# Patient Record
Sex: MALE | Race: WHITE
[De-identification: names, ages, dates, MRNs, and addresses within clinical notes are randomized per-mention and may not be internally consistent; named-entity substitution may affect disease eponyms.]

---

## 2017-07-05 LAB
ERYTHROCYTE [DISTWIDTH] IN BLOOD BY AUTOMATED COUNT: 12.4 % (ref 11.5–14.5)
HCT VFR BLD CALC: 44 % (ref 42–54)
HGB BLD-MCNC: 15.4 G/DL (ref 13.5–17.5)
MCH RBC QN AUTO: 34.4 PG (ref 26–34)
MCHC RBC AUTO-ENTMCNC: 35 G/DL (ref 31–37)
MCV RBC: 98.2 FL (ref 80–100)
PLATELET # BLD: 184 10X3/UL (ref 130–400)
PMV BLD AUTO: 9.8 FL (ref 7.4–10.4)
RBC # BLD AUTO: 4.48 10X6/UL (ref 4.2–6.1)
WBC # BLD AUTO: 7.2 10X3/UL (ref 4.8–10.8)

## 2017-07-06 ENCOUNTER — HOSPITAL ENCOUNTER (OUTPATIENT)
Dept: HOSPITAL 84 - D.OPS | Age: 77
Discharge: HOME | End: 2017-07-06
Attending: UROLOGY
Payer: MEDICARE

## 2017-07-06 VITALS — BODY MASS INDEX: 31.15 KG/M2 | BODY MASS INDEX: 31.15 KG/M2 | HEIGHT: 72 IN | WEIGHT: 230 LBS

## 2017-07-06 VITALS — SYSTOLIC BLOOD PRESSURE: 105 MMHG | DIASTOLIC BLOOD PRESSURE: 60 MMHG

## 2017-07-06 DIAGNOSIS — N13.8: ICD-10-CM

## 2017-07-06 DIAGNOSIS — Z01.812: ICD-10-CM

## 2017-07-06 DIAGNOSIS — N39.41: ICD-10-CM

## 2017-07-06 DIAGNOSIS — N40.1: Primary | ICD-10-CM

## 2017-07-06 DIAGNOSIS — N30.10: ICD-10-CM

## 2017-07-06 DIAGNOSIS — R35.0: ICD-10-CM

## 2017-07-06 LAB
ANION GAP SERPL CALC-SCNC: 9.3 MMOL/L (ref 8–16)
BUN SERPL-MCNC: 18 MG/DL (ref 7–18)
CALCIUM SERPL-MCNC: 8.7 MG/DL (ref 8.5–10.1)
CHLORIDE SERPL-SCNC: 103 MMOL/L (ref 98–107)
CO2 SERPL-SCNC: 28.7 MMOL/L (ref 21–32)
CREAT SERPL-MCNC: 1.4 MG/DL (ref 0.6–1.3)
GLUCOSE SERPL-MCNC: 118 MG/DL (ref 74–106)
OSMOLALITY SERPL CALC.SUM OF ELEC: 276 MOSM/KG (ref 275–300)
POTASSIUM SERPL-SCNC: 4 MMOL/L (ref 3.5–5.1)
SODIUM SERPL-SCNC: 137 MMOL/L (ref 136–145)

## 2017-07-06 NOTE — NUR
1400- PT WITH HOB ELEVATED. VSS. WIFE AT BEDSIDE.
1415- DR. VILLAREAL AT BEDSIDE, DISCUSSING PROCEDURAL FINDINGS. PT UP OOB
TO BR, VOIDED WITHOUT DIFFICULTY
1425- IV D/C'D, PT TOLERATED. CATHETER INTACT.
1445- DISCHARGE INSTRUCTIONS COMPLETED. PAPERWORK SIGNED.
1450- PT DISCHARGED VIA WHEELCHAIR WITH WIFE.

## 2018-10-10 ENCOUNTER — HOSPITAL ENCOUNTER (OUTPATIENT)
Dept: HOSPITAL 84 - D.CATH | Age: 78
Discharge: HOME | End: 2018-10-10
Attending: INTERNAL MEDICINE
Payer: MEDICARE

## 2018-10-10 VITALS — WEIGHT: 225.47 LBS | HEIGHT: 72 IN | BODY MASS INDEX: 30.54 KG/M2 | BODY MASS INDEX: 30.54 KG/M2

## 2018-10-10 VITALS — DIASTOLIC BLOOD PRESSURE: 64 MMHG | SYSTOLIC BLOOD PRESSURE: 163 MMHG

## 2018-10-10 DIAGNOSIS — R07.89: Primary | ICD-10-CM

## 2018-10-10 DIAGNOSIS — Z01.812: ICD-10-CM

## 2018-10-10 LAB
ANION GAP SERPL CALC-SCNC: 13.7 MMOL/L (ref 8–16)
BASOPHILS NFR BLD AUTO: 0.4 % (ref 0–2)
BUN SERPL-MCNC: 18 MG/DL (ref 7–18)
CALCIUM SERPL-MCNC: 9.7 MG/DL (ref 8.5–10.1)
CHLORIDE SERPL-SCNC: 102 MMOL/L (ref 98–107)
CO2 SERPL-SCNC: 26.9 MMOL/L (ref 21–32)
CREAT SERPL-MCNC: 1.1 MG/DL (ref 0.6–1.3)
EOSINOPHIL NFR BLD: 13.4 % (ref 0–7)
ERYTHROCYTE [DISTWIDTH] IN BLOOD BY AUTOMATED COUNT: 12.3 % (ref 11.5–14.5)
GLUCOSE SERPL-MCNC: 102 MG/DL (ref 74–106)
HCT VFR BLD CALC: 45.8 % (ref 42–54)
HGB BLD-MCNC: 16 G/DL (ref 13.5–17.5)
IMM GRANULOCYTES NFR BLD: 0.1 % (ref 0–5)
LYMPHOCYTES NFR BLD AUTO: 20.8 % (ref 15–50)
MCH RBC QN AUTO: 34.3 PG (ref 26–34)
MCHC RBC AUTO-ENTMCNC: 34.9 G/DL (ref 31–37)
MCV RBC: 98.1 FL (ref 80–100)
MONOCYTES NFR BLD: 6.7 % (ref 2–11)
NEUTROPHILS NFR BLD AUTO: 58.6 % (ref 40–80)
OSMOLALITY SERPL CALC.SUM OF ELEC: 277 MOSM/KG (ref 275–300)
PLATELET # BLD: 245 10X3/UL (ref 130–400)
PMV BLD AUTO: 9.7 FL (ref 7.4–10.4)
POTASSIUM SERPL-SCNC: 4.6 MMOL/L (ref 3.5–5.1)
RBC # BLD AUTO: 4.67 10X6/UL (ref 4.2–6.1)
SODIUM SERPL-SCNC: 138 MMOL/L (ref 136–145)
WBC # BLD AUTO: 9.3 10X3/UL (ref 4.8–10.8)

## 2018-10-15 ENCOUNTER — HOSPITAL ENCOUNTER (OUTPATIENT)
Dept: HOSPITAL 84 - D.CT | Age: 78
Discharge: HOME | End: 2018-10-15
Attending: FAMILY MEDICINE
Payer: MEDICARE

## 2018-10-15 VITALS — BODY MASS INDEX: 30.6 KG/M2

## 2018-10-15 DIAGNOSIS — R10.11: Primary | ICD-10-CM

## 2018-11-08 ENCOUNTER — HOSPITAL ENCOUNTER (OUTPATIENT)
Dept: HOSPITAL 84 - D.CT | Age: 78
Discharge: HOME | End: 2018-11-08
Attending: FAMILY MEDICINE
Payer: MEDICARE

## 2018-11-08 VITALS — BODY MASS INDEX: 30.6 KG/M2

## 2018-11-08 DIAGNOSIS — R91.8: Primary | ICD-10-CM

## 2018-12-22 ENCOUNTER — HOSPITAL ENCOUNTER (EMERGENCY)
Dept: HOSPITAL 84 - D.ER | Age: 78
Discharge: HOME | End: 2018-12-22
Payer: MEDICARE

## 2018-12-22 VITALS
BODY MASS INDEX: 31.22 KG/M2 | WEIGHT: 230.48 LBS | HEIGHT: 72 IN | BODY MASS INDEX: 31.22 KG/M2 | HEIGHT: 72 IN | WEIGHT: 230.48 LBS

## 2018-12-22 VITALS — SYSTOLIC BLOOD PRESSURE: 158 MMHG | DIASTOLIC BLOOD PRESSURE: 75 MMHG

## 2018-12-22 DIAGNOSIS — I10: ICD-10-CM

## 2018-12-22 DIAGNOSIS — K92.1: ICD-10-CM

## 2018-12-22 DIAGNOSIS — K57.32: Primary | ICD-10-CM

## 2018-12-22 LAB
ALBUMIN SERPL-MCNC: 3.9 G/DL (ref 3.4–5)
ALP SERPL-CCNC: 66 U/L (ref 46–116)
ALT SERPL-CCNC: 15 U/L (ref 10–68)
ANION GAP SERPL CALC-SCNC: 11.1 MMOL/L (ref 8–16)
APPEARANCE UR: CLEAR
APTT BLD: 26.3 SECONDS (ref 22.8–39.4)
BACTERIA #/AREA URNS HPF: (no result) /HPF
BASOPHILS NFR BLD AUTO: 0.5 % (ref 0–2)
BILIRUB SERPL-MCNC: 0.42 MG/DL (ref 0.2–1.3)
BILIRUB SERPL-MCNC: NEGATIVE MG/DL
BUN SERPL-MCNC: 18 MG/DL (ref 7–18)
CALCIUM SERPL-MCNC: 8.9 MG/DL (ref 8.5–10.1)
CHLORIDE SERPL-SCNC: 103 MMOL/L (ref 98–107)
CO2 SERPL-SCNC: 28.1 MMOL/L (ref 21–32)
COLOR UR: YELLOW
CREAT SERPL-MCNC: 1.3 MG/DL (ref 0.6–1.3)
EOSINOPHIL NFR BLD: 16.7 % (ref 0–7)
ERYTHROCYTE [DISTWIDTH] IN BLOOD BY AUTOMATED COUNT: 12.4 % (ref 11.5–14.5)
GLOBULIN SER-MCNC: 3.6 G/L
GLUCOSE SERPL-MCNC: 108 MG/DL (ref 74–106)
GLUCOSE SERPL-MCNC: NEGATIVE MG/DL
HCT VFR BLD CALC: 44.1 % (ref 42–54)
HGB BLD-MCNC: 15.1 G/DL (ref 13.5–17.5)
IMM GRANULOCYTES NFR BLD: 0.1 % (ref 0–5)
INR PPP: 0.96 (ref 0.85–1.17)
KETONES UR STRIP-MCNC: NEGATIVE MG/DL
LIPASE SERPL-CCNC: 134 U/L (ref 73–393)
LYMPHOCYTES NFR BLD AUTO: 16.4 % (ref 15–50)
MCH RBC QN AUTO: 33.3 PG (ref 26–34)
MCHC RBC AUTO-ENTMCNC: 34.2 G/DL (ref 31–37)
MCV RBC: 97.4 FL (ref 80–100)
MONOCYTES NFR BLD: 8.3 % (ref 2–11)
NEUTROPHILS NFR BLD AUTO: 58 % (ref 40–80)
NITRITE UR-MCNC: NEGATIVE MG/ML
OSMOLALITY SERPL CALC.SUM OF ELEC: 278 MOSM/KG (ref 275–300)
PH UR STRIP: 6 [PH] (ref 5–6)
PLATELET # BLD: 185 10X3/UL (ref 130–400)
PMV BLD AUTO: 9.8 FL (ref 7.4–10.4)
POTASSIUM SERPL-SCNC: 4.2 MMOL/L (ref 3.5–5.1)
PROT SERPL-MCNC: 7.5 G/DL (ref 6.4–8.2)
PROT UR-MCNC: NEGATIVE MG/DL
PROTHROMBIN TIME: 12.3 SECONDS (ref 11.6–15)
RBC # BLD AUTO: 4.53 10X6/UL (ref 4.2–6.1)
RBC #/AREA URNS HPF: (no result) /HPF (ref 0–5)
SODIUM SERPL-SCNC: 138 MMOL/L (ref 136–145)
SP GR UR STRIP: 1.01 (ref 1–1.02)
TROPONIN I SERPL-MCNC: < 0.017 NG/ML (ref 0–0.06)
UROBILINOGEN UR-MCNC: NORMAL MG/DL
WBC # BLD AUTO: 10.2 10X3/UL (ref 4.8–10.8)
WBC #/AREA URNS HPF: (no result) /HPF (ref 0–5)

## 2019-01-14 ENCOUNTER — HOSPITAL ENCOUNTER (OUTPATIENT)
Dept: HOSPITAL 84 - D.LABREF | Age: 79
Discharge: HOME | End: 2019-01-14
Attending: UROLOGY
Payer: MEDICARE

## 2019-01-14 VITALS — BODY MASS INDEX: 31.2 KG/M2

## 2019-01-14 DIAGNOSIS — R31.9: Primary | ICD-10-CM

## 2019-01-21 LAB
ANION GAP SERPL CALC-SCNC: 12 MMOL/L (ref 8–16)
BASOPHILS NFR BLD AUTO: 0.5 % (ref 0–2)
BUN SERPL-MCNC: 18 MG/DL (ref 7–18)
CALCIUM SERPL-MCNC: 8.8 MG/DL (ref 8.5–10.1)
CHLORIDE SERPL-SCNC: 103 MMOL/L (ref 98–107)
CO2 SERPL-SCNC: 29.5 MMOL/L (ref 21–32)
CREAT SERPL-MCNC: 1.2 MG/DL (ref 0.6–1.3)
EOSINOPHIL NFR BLD: 20.4 % (ref 0–7)
ERYTHROCYTE [DISTWIDTH] IN BLOOD BY AUTOMATED COUNT: 12.5 % (ref 11.5–14.5)
GLUCOSE SERPL-MCNC: 77 MG/DL (ref 74–106)
HCT VFR BLD CALC: 45.4 % (ref 42–54)
HGB BLD-MCNC: 15.6 G/DL (ref 13.5–17.5)
IMM GRANULOCYTES NFR BLD: 0.1 % (ref 0–5)
LYMPHOCYTES NFR BLD AUTO: 24.9 % (ref 15–50)
MCH RBC QN AUTO: 33.2 PG (ref 26–34)
MCHC RBC AUTO-ENTMCNC: 34.4 G/DL (ref 31–37)
MCV RBC: 96.6 FL (ref 80–100)
MONOCYTES NFR BLD: 6.8 % (ref 2–11)
NEUTROPHILS NFR BLD AUTO: 47.3 % (ref 40–80)
OSMOLALITY SERPL CALC.SUM OF ELEC: 279 MOSM/KG (ref 275–300)
PLATELET # BLD: 184 10X3/UL (ref 130–400)
PMV BLD AUTO: 9.8 FL (ref 7.4–10.4)
POTASSIUM SERPL-SCNC: 4.5 MMOL/L (ref 3.5–5.1)
RBC # BLD AUTO: 4.7 10X6/UL (ref 4.2–6.1)
SODIUM SERPL-SCNC: 140 MMOL/L (ref 136–145)
WBC # BLD AUTO: 8.8 10X3/UL (ref 4.8–10.8)

## 2019-01-22 ENCOUNTER — HOSPITAL ENCOUNTER (INPATIENT)
Dept: HOSPITAL 84 - D.MS | Age: 79
LOS: 5 days | Discharge: HOME | DRG: 330 | End: 2019-01-27
Attending: SURGERY | Admitting: SURGERY
Payer: MEDICARE

## 2019-01-22 VITALS — DIASTOLIC BLOOD PRESSURE: 67 MMHG | SYSTOLIC BLOOD PRESSURE: 163 MMHG

## 2019-01-22 VITALS
BODY MASS INDEX: 31.5 KG/M2 | BODY MASS INDEX: 31.5 KG/M2 | HEIGHT: 71 IN | WEIGHT: 225 LBS | BODY MASS INDEX: 31.5 KG/M2 | WEIGHT: 225 LBS | BODY MASS INDEX: 31.5 KG/M2 | HEIGHT: 71 IN

## 2019-01-22 VITALS — SYSTOLIC BLOOD PRESSURE: 165 MMHG | DIASTOLIC BLOOD PRESSURE: 75 MMHG

## 2019-01-22 VITALS — SYSTOLIC BLOOD PRESSURE: 162 MMHG | DIASTOLIC BLOOD PRESSURE: 75 MMHG

## 2019-01-22 VITALS — SYSTOLIC BLOOD PRESSURE: 171 MMHG | DIASTOLIC BLOOD PRESSURE: 78 MMHG

## 2019-01-22 DIAGNOSIS — I10: ICD-10-CM

## 2019-01-22 DIAGNOSIS — E78.00: ICD-10-CM

## 2019-01-22 DIAGNOSIS — K56.7: ICD-10-CM

## 2019-01-22 DIAGNOSIS — K57.32: Primary | ICD-10-CM

## 2019-01-22 PROCEDURE — 0DTN0ZZ RESECTION OF SIGMOID COLON, OPEN APPROACH: ICD-10-PCS | Performed by: SURGERY

## 2019-01-23 VITALS — SYSTOLIC BLOOD PRESSURE: 130 MMHG | DIASTOLIC BLOOD PRESSURE: 64 MMHG

## 2019-01-23 VITALS — DIASTOLIC BLOOD PRESSURE: 66 MMHG | SYSTOLIC BLOOD PRESSURE: 123 MMHG

## 2019-01-23 VITALS — DIASTOLIC BLOOD PRESSURE: 61 MMHG | SYSTOLIC BLOOD PRESSURE: 128 MMHG

## 2019-01-23 VITALS — DIASTOLIC BLOOD PRESSURE: 62 MMHG | SYSTOLIC BLOOD PRESSURE: 132 MMHG

## 2019-01-23 VITALS — SYSTOLIC BLOOD PRESSURE: 117 MMHG | DIASTOLIC BLOOD PRESSURE: 59 MMHG

## 2019-01-23 LAB
ANION GAP SERPL CALC-SCNC: 16.9 MMOL/L (ref 8–16)
BASOPHILS NFR BLD AUTO: 0 % (ref 0–2)
BUN SERPL-MCNC: 15 MG/DL (ref 7–18)
CALCIUM SERPL-MCNC: 8.1 MG/DL (ref 8.5–10.1)
CHLORIDE SERPL-SCNC: 102 MMOL/L (ref 98–107)
CO2 SERPL-SCNC: 22.5 MMOL/L (ref 21–32)
CREAT SERPL-MCNC: 1.2 MG/DL (ref 0.6–1.3)
EOSINOPHIL NFR BLD: 0 % (ref 0–7)
ERYTHROCYTE [DISTWIDTH] IN BLOOD BY AUTOMATED COUNT: 12.4 % (ref 11.5–14.5)
GLUCOSE SERPL-MCNC: 137 MG/DL (ref 74–106)
HCT VFR BLD CALC: 41.7 % (ref 42–54)
HGB BLD-MCNC: 14.5 G/DL (ref 13.5–17.5)
IMM GRANULOCYTES NFR BLD: 0.2 % (ref 0–5)
LYMPHOCYTES NFR BLD AUTO: 6.4 % (ref 15–50)
MCH RBC QN AUTO: 33.3 PG (ref 26–34)
MCHC RBC AUTO-ENTMCNC: 34.8 G/DL (ref 31–37)
MCV RBC: 95.9 FL (ref 80–100)
MONOCYTES NFR BLD: 5.5 % (ref 2–11)
NEUTROPHILS NFR BLD AUTO: 87.9 % (ref 40–80)
OSMOLALITY SERPL CALC.SUM OF ELEC: 276 MOSM/KG (ref 275–300)
PLATELET # BLD: 188 10X3/UL (ref 130–400)
PMV BLD AUTO: 9.8 FL (ref 7.4–10.4)
POTASSIUM SERPL-SCNC: 4.4 MMOL/L (ref 3.5–5.1)
RBC # BLD AUTO: 4.35 10X6/UL (ref 4.2–6.1)
SODIUM SERPL-SCNC: 137 MMOL/L (ref 136–145)
WBC # BLD AUTO: 13.6 10X3/UL (ref 4.8–10.8)

## 2019-01-23 NOTE — NUR
PT REPORTS FEELING OF NOT FULLY EMPTYING BLADDER, SUGGESTED BLADDER SCANNER,
PT REFUSED STATING HE WASN'T FULL YET. WILL CONTINUE TO MONITOR OUTPUT.

## 2019-01-24 VITALS — DIASTOLIC BLOOD PRESSURE: 50 MMHG | SYSTOLIC BLOOD PRESSURE: 129 MMHG

## 2019-01-24 VITALS — DIASTOLIC BLOOD PRESSURE: 60 MMHG | SYSTOLIC BLOOD PRESSURE: 119 MMHG

## 2019-01-24 VITALS — SYSTOLIC BLOOD PRESSURE: 125 MMHG | DIASTOLIC BLOOD PRESSURE: 58 MMHG

## 2019-01-24 LAB
ANION GAP SERPL CALC-SCNC: 13.4 MMOL/L (ref 8–16)
BASOPHILS NFR BLD AUTO: 0.2 % (ref 0–2)
BUN SERPL-MCNC: 24 MG/DL (ref 7–18)
CALCIUM SERPL-MCNC: 7.2 MG/DL (ref 8.5–10.1)
CHLORIDE SERPL-SCNC: 103 MMOL/L (ref 98–107)
CO2 SERPL-SCNC: 25.1 MMOL/L (ref 21–32)
CREAT SERPL-MCNC: 1.6 MG/DL (ref 0.6–1.3)
EOSINOPHIL NFR BLD: 2.8 % (ref 0–7)
ERYTHROCYTE [DISTWIDTH] IN BLOOD BY AUTOMATED COUNT: 13.3 % (ref 11.5–14.5)
GLUCOSE SERPL-MCNC: 93 MG/DL (ref 74–106)
HCT VFR BLD CALC: 37.4 % (ref 42–54)
HGB BLD-MCNC: 12.2 G/DL (ref 13.5–17.5)
IMM GRANULOCYTES NFR BLD: 0.2 % (ref 0–5)
LYMPHOCYTES NFR BLD AUTO: 17.4 % (ref 15–50)
MCH RBC QN AUTO: 33 PG (ref 26–34)
MCHC RBC AUTO-ENTMCNC: 32.6 G/DL (ref 31–37)
MCV RBC: 101.1 FL (ref 80–100)
MONOCYTES NFR BLD: 6 % (ref 2–11)
NEUTROPHILS NFR BLD AUTO: 73.4 % (ref 40–80)
OSMOLALITY SERPL CALC.SUM OF ELEC: 277 MOSM/KG (ref 275–300)
PLATELET # BLD: 152 10X3/UL (ref 130–400)
PMV BLD AUTO: 10 FL (ref 7.4–10.4)
POTASSIUM SERPL-SCNC: 4.5 MMOL/L (ref 3.5–5.1)
RBC # BLD AUTO: 3.7 10X6/UL (ref 4.2–6.1)
SODIUM SERPL-SCNC: 137 MMOL/L (ref 136–145)
WBC # BLD AUTO: 9 10X3/UL (ref 4.8–10.8)

## 2019-01-24 NOTE — NUR
PT LYING IN BED RESTING WITHOUT DISTRESS OR NEEDS. CONCUR W/ LPN ASSESSMENT.
CL IN REACH, WILL CONTINUE TO MONITOR

## 2019-01-24 NOTE — NUR
LPN NOTE-STATES PASSING GAS BUT BOWEL SOUNDS HYPOACTIVE. NO NAUSEA AT PRESENT.
STATES PAIN LEVEL5/10 BUT STATES PCA IS WORKING TO HELP. LAP SITES CLEAN AND
DRY WITH MIDLINE DRESSING CLEAN AND DRY. ABD DISTENDED. ENCOURAGED TO BE UP IN
CHAIR AND IN HALLWAY THIS SHIFT. WILL CONTINUE TO MONITOR

## 2019-01-24 NOTE — NUR
PT STATES HIS WIFE CAME BY AND BROUGHT, "SUPPLIES," FROM HOME. PT STATED HE
SELF CATHS AT HOME AND HE SELF CATHED HISELF AFTER SHE BROUGHT THEM.

## 2019-01-24 NOTE — OP
PATIENT NAME:  SAL DELGADO                            MEDICAL RECORD: [id number]
:[birth year]                                             LOCATION:D.MS [id number]
                                                         ADMISSION DATE:19
SURGEON:  JOSE ATKINSON MD          
 
 
DATE OF OPERATION:  2019
 
PREOPERATIVE DIAGNOSES:
1.  Recurrent diverticulitis.
2.  Hypertension.
3.  Hypercholesterolemia.
 
POSTOPERATIVE DIAGNOSES:
1.  Recurrent diverticulitis.
2.  Hypertension.
3.  Hypercholesterolemia.
 
PROCEDURE:  Hand-assisted laparoscopic sigmoid colectomy.
 
SURGEON:  Jose Atkinson MD
 
ASSISTANT:  FAUSTINA Mcgarry
 
REPORT OF OPERATION:  The patient's abdomen was prepped and draped in sterile
fashion.  A cutdown was made in the suprapubic region in the midline and
electrocautery was used to dissect through the subcutaneous tissues and fascia
into the abdominal cavity.  Once inside, a GelPort was inserted with a 5-mm
trocar within it.  The abdomen was insufflated and then a 5-mm trocar was placed
under direct visualization in the right lateral abdomen and a 12-mm trocar was
placed just anterior to the right anterior-superior iliac crest.  The patient
had some inflammatory adhesions of the sigmoid colon to the inferior aspect of
the abdominal cavity.  These were taken down using electrocautery.  Eventually,
we were able to free up this portion of the bowel and mobilize it medially.  We
took down the white line of Toldt and mobilized the splenic flexure using
electrocautery.  In order to facilitate this, I actually had to put another 5-mm
trocar in the left lateral abdomen.  Once the splenic flexure was mobilized,
then we were able to move the entire left colon medially.  A window was made in
the mesocolon at the rectosigmoid junction and the proximal rectum was
transected with a 45 blue load Endo-PETER stapler.  The mesentery was taken down
with two fires of 45 white load Endo-PETER stapler.  We then eviscerated this
portion of the sigmoid colon through the wound protector.  We performed clamp
and tie technique to take down some of the residual mesentery.  I then
eventually transected the distal descending colon using electrocautery.  This
portion of bowel was sent off for permanent specimen.  A 2-0 Prolene was used to
make a pursestring on the open bowel and a 29 EEA anvil was inserted.  After the
pursestring was tied down tightly, then the multiple anal dilators were placed
through the anus and rectum followed by the 29 EEA stapler.  An end-to-end
anastomosis was performed under direct visualization.  At the conclusion of
this, we had 2 complete rings of tissue in the stapler and we checked the
anastomosis under water by instilling air into the rectum and there was no sign
of leak.  The 3-0 silks were used in a Lemberted fashion to oversew the staple
line.  We then irrigated out the abdomen thoroughly with normal saline.  The
midline fascia was then closed with running #1 loop PDS's times 2.  The 12-mm
trocar site fascia was closed with a single interrupted #0 Vicryl.  The wounds
were irrigated out with normal saline and then closed with staples.
 
COMPLICATIONS:  None.
 
 
 
OPERATIVE REPORT                               [id number]    DANNYSAL          
 
 
 
CONDITION:  Stable.
 
ANESTHESIA:  General endotracheal.
 
BLOOD LOSS:  30 mL.
 
TRANSINT:[id number] Voice Confirmation ID: [id number] DOCUMENT ID: [id number]
                                           
                                           JOSE ATKINSON MD          
 
 
 
Electronically Signed by JOSE ATKINSON on 19 at 1044
 
 
 
 
 
 
 
 
 
 
 
 
 
 
 
 
 
 
 
 
 
 
 
 
 
 
 
 
 
 
 
 
 
CC: EMELY SCHULTZ MD and ELIZABETH COOPER MD                        [id number]
DICTATION DATE: 19 1533     :     19 1823      ADM IN  
                                                                              
[facility]                                          
[address] [place] [address]

## 2019-01-24 NOTE — MORECARE
CASE MANAGEMENT DISCHARGE SUMMARY
 
 
PATIENT: SAL DELGADO                      UNIT: [id number]
ACCOUNT#: [id number]                       ADM DATE: 19
AGE: 78     : 44  SEX: M            ROOM/BED: D.2205    
AUTHOR: FLO,DOC                             PHYSICIAN:                               
 
REFERRING PHYSICIAN: NEEMA ATKINSON MD          
DATE OF SERVICE: 19
Discharge Plan
 
 
Patient Name: SAL DELGADO
Facility: [facility]
Encounter #: [id number]
Medical Record #: [id number]
: 1940
Planned Disposition: Home
Anticipated Discharge Date: 
 
Discharge Date: 
Expected LOS: 
Initial Reviewer: [email or web address]
Initial Review Date: 2019
Generated: 19   1:57 pm 
Comments
 
DCP- Discharge Planning
 
Updated by [email or web address]: Tess Eaton on 19  11:54 am CT
Patient Name: SAL DELGADO                                     
Admission Status: Elective   
Accout number: [id number]                              
Admission Date: 2019   
: 1940                                                        
Admission Diagnosis:   
Attending: NEEMA ATKINSON                                                
Current LOS:  2   
  
Anticipated DC Date:    
Planned Disposition: Home   
Primary Insurance: MEDICARE A & B   
  
  
Discharge Planning Comments:   
CM met with patient to assess discharge planning needs. Patient lives 
independently at home where he plans to return at discharge. His wife Aruna 
will be his  home. He states his home is safe to return. He denies any 
HH or DME needs at this time. IMM served and explained. He anticipates to be 
 
discharged tomorrow. CM will continue to follow and assist with DC planning 
as needed  
  
  
  
  
  
: Tess Eaton
 DCPIA - Discharge Planning Initial Assessment
 
Updated by [email or web address]: Tess Eaton on 19  12:52 pm
*  Is the patient Alert and Oriented?
Yes
*  How many steps to enter\exit or inside your home?
 
*  PCP
[facility]
*  Pharmacy
[facility]
*  Preadmission Environment
Home with Family
*  ADLs
Independent
*  Equipment
None
*  List name and contact numbers for known caregivers / representatives who 
currently or will assist patient after discharge:
ARUNA (WIFE) [phone]
*  Verbal permission to speak to the caregivers and representatives has been 
obtained from the patient.
N/A
*  Community resources currently utilized
None
*  Additional services required to return to the preadmission environment?
No
*  Can the patient safely return to the preadmission environment?
Yes
*  Has this patient been hospitalized within the prior 30 days at any 
hospital?
No
 
 
 
 
 
Coverage Notice
 
Reviewer: [id number] Chelsey Eaton
 
Notice Issued Date-Time: 2019  12:00
Notice Type: IM Discharge Notice
 
 
Notice Delivered To: Patient
Relationship to Patient: 
Representative Name: 
 
Delivery Method: HAND - Hand Delivered
Brittny Days:
Prior Verbal Notification: 
 
Recipient Understood Notice: Yes
Recipient Signature: Yes
Med Rec Note Co-signed by Attending:
 
Coverage Notice Comment:  
Patient Name: SAL DELGADO
Encounter #: [id number]
Page [id number]
 
 
 
 
 
Electronically Signed by ARTHUR ROSSI on 19 at 1257
 
 
 
 
 
 
**All edits/amendments must be made on the electronic document**
 
DICTATION DATE: 19     : FADY  19 1256     
RPT#: [id number]                                DC DATE:        
                                               STATUS: ADM IN  
[facility]
[address] [place] [address]
***END OF REPORT***

## 2019-01-24 NOTE — NUR
SUPINE IN BED, DENIES NEEDS AT THIS TIME. STATES HIS PAIN IS WELL
CONTROLLED. REPORTS
HE HAS BEEN AND WILL STILL
CONTINUE TO SELF-CATH AS NEEDED. INFORMED PT OF IMPORTANCE OF ACCURATE OUTPUT
RECORDING. PT AGRRED TO EMPTY HIS VOIDS INTO URINAL OR HAT FOR MORE SPECIFIC
I&Os. SUGGESTED PT APPLY SCDs, PT REFUSED. WILL CONTINUE TO MONITOR.

## 2019-01-25 VITALS — DIASTOLIC BLOOD PRESSURE: 60 MMHG | SYSTOLIC BLOOD PRESSURE: 157 MMHG

## 2019-01-25 VITALS — DIASTOLIC BLOOD PRESSURE: 71 MMHG | SYSTOLIC BLOOD PRESSURE: 163 MMHG

## 2019-01-25 VITALS — SYSTOLIC BLOOD PRESSURE: 146 MMHG | DIASTOLIC BLOOD PRESSURE: 68 MMHG

## 2019-01-25 VITALS — SYSTOLIC BLOOD PRESSURE: 169 MMHG | DIASTOLIC BLOOD PRESSURE: 73 MMHG

## 2019-01-25 LAB
ANION GAP SERPL CALC-SCNC: 13 MMOL/L (ref 8–16)
BASOPHILS NFR BLD AUTO: 0.2 % (ref 0–2)
BUN SERPL-MCNC: 16 MG/DL (ref 7–18)
CALCIUM SERPL-MCNC: 7.3 MG/DL (ref 8.5–10.1)
CHLORIDE SERPL-SCNC: 104 MMOL/L (ref 98–107)
CO2 SERPL-SCNC: 23.9 MMOL/L (ref 21–32)
CREAT SERPL-MCNC: 1 MG/DL (ref 0.6–1.3)
EOSINOPHIL NFR BLD: 3.4 % (ref 0–7)
ERYTHROCYTE [DISTWIDTH] IN BLOOD BY AUTOMATED COUNT: 13.1 % (ref 11.5–14.5)
GLUCOSE SERPL-MCNC: 102 MG/DL (ref 74–106)
HCT VFR BLD CALC: 36.2 % (ref 42–54)
HGB BLD-MCNC: 11.7 G/DL (ref 13.5–17.5)
IMM GRANULOCYTES NFR BLD: 0.4 % (ref 0–5)
LYMPHOCYTES NFR BLD AUTO: 12 % (ref 15–50)
MCH RBC QN AUTO: 32.5 PG (ref 26–34)
MCHC RBC AUTO-ENTMCNC: 32.3 G/DL (ref 31–37)
MCV RBC: 100.6 FL (ref 80–100)
MONOCYTES NFR BLD: 6.8 % (ref 2–11)
NEUTROPHILS NFR BLD AUTO: 77.2 % (ref 40–80)
OSMOLALITY SERPL CALC.SUM OF ELEC: 274 MOSM/KG (ref 275–300)
PLATELET # BLD: 157 10X3/UL (ref 130–400)
PMV BLD AUTO: 10.1 FL (ref 7.4–10.4)
POTASSIUM SERPL-SCNC: 3.9 MMOL/L (ref 3.5–5.1)
RBC # BLD AUTO: 3.6 10X6/UL (ref 4.2–6.1)
SODIUM SERPL-SCNC: 137 MMOL/L (ref 136–145)
WBC # BLD AUTO: 8.4 10X3/UL (ref 4.8–10.8)

## 2019-01-25 NOTE — NUR
ADMINSTERED PT MEDICATION WITH NO ISSUES. PT TOLERATED WELL. PT APOLOGIZED FOR
"OUTBURST" STATES "I HOPE TO GET SOME REST."

## 2019-01-25 NOTE — NUR
WENT TO ENTER PT ROOM AND PT NOT IN ROOM. PT AT NURSES STATION UPSET. STATES
THAT HE IS "CHOKING ON PHLEGM" AND "I NEED MEDICINE NOW!!" CALMED PT DONE.
ASSESSED LUNGS. NO AUDIBLE CRACKLES OR WHEEZES NOTICED WHEN AUSCULTATING.
RETURNED PT BACK TO BED WITH NO ISSUES. CALL LIGHT IN REACH.

## 2019-01-25 NOTE — NUR
NUTRITION F/U
PT NOW ON CLEAR LIQUID DIET. WILL CONTINUE TO MONITOR DIET ADVANCEMENT, PO
INTAKE.
RD FOLLOWING

## 2019-01-25 NOTE — NUR
WHILE PASSING THE ROOM, I HEARD THE TOILET FLUSH. KNOCKED ON DOOR AND ENTERED
AS PT WAS LAYING BACK IN HIS BED. GLANCED IN BATHROOM TO SEE BOTH URINALS WERE
EMPTY AND URINE HAT THAT WAS PLACED UNDER TOILET SEAT WAS IN THE TRASH.
REINFORCED TO PT THAT WE NEED RECORD THE AMOUNT OF URINE IN MLS TO HELP ASSESS
HIS KIDNEY FUNCTION. PT SAID, "OH OKAY, OH YEAH," AND THEN BEGAN TO SPEAK
ABOUT HOW DISTENDED HE'S BEEN AND HOW HE HAS BEEN HOLDING FLUID, BUT HE IS
 GOING HOME TODAY. PT THE REQUESTED ALL LIGHTS BE OFF AND DOOR SHUT SO HE
COULD TRY TO GET SOME MORE REST.

## 2019-01-26 VITALS — SYSTOLIC BLOOD PRESSURE: 151 MMHG | DIASTOLIC BLOOD PRESSURE: 69 MMHG

## 2019-01-26 VITALS — DIASTOLIC BLOOD PRESSURE: 71 MMHG | SYSTOLIC BLOOD PRESSURE: 147 MMHG

## 2019-01-26 VITALS — SYSTOLIC BLOOD PRESSURE: 162 MMHG | DIASTOLIC BLOOD PRESSURE: 80 MMHG

## 2019-01-26 VITALS — DIASTOLIC BLOOD PRESSURE: 78 MMHG | SYSTOLIC BLOOD PRESSURE: 166 MMHG

## 2019-01-26 VITALS — DIASTOLIC BLOOD PRESSURE: 74 MMHG | SYSTOLIC BLOOD PRESSURE: 157 MMHG

## 2019-01-26 NOTE — NUR
PATIENT RESTING IN BED WITH CNA AT BEDSIDE AND REQUESTED SLEEP MED. PATIENT
DENIES OTHER NEEDS AT THIS TIME. BED IN LOWEST POSITION AND CL WITHIN REACH.
ENCOURAGED THE PATIENT TO CALL IF HE HAS NEEDS. WILL ADMINISTER MEDS AND
CONTINUE TO MONITOR.

## 2019-01-26 NOTE — NUR
MORNING ASSESSMENT COMPLETE. SEE ASSESSMENT FLWOSHEET FOR FURTHER DETAILS. PT
LYING IN BED AAO X4 TO PERSON, PLACE, TIME, AND SITUATION. WIFE AT BEDSIDE. 3
LAP SITES AND MIDLINE INCISION NOTED TO ABD. SLIGHT DISTENTION NOTED TO ABD;
PT CLAIMS IN HAS GONE DOWN A LOT. BOWEL SOUNDS ACTIVE X4. DENIES NEEDS AT THIS
TIME. CL INR EACH.

## 2019-01-27 VITALS — SYSTOLIC BLOOD PRESSURE: 91 MMHG | DIASTOLIC BLOOD PRESSURE: 46 MMHG

## 2019-01-27 VITALS — DIASTOLIC BLOOD PRESSURE: 72 MMHG | SYSTOLIC BLOOD PRESSURE: 154 MMHG

## 2019-01-27 VITALS — DIASTOLIC BLOOD PRESSURE: 72 MMHG | SYSTOLIC BLOOD PRESSURE: 147 MMHG

## 2019-01-29 NOTE — MORECARE
CASE MANAGEMENT DISCHARGE SUMMARY
 
 
PATIENT: SAL DELGADO                      UNIT: [id number]
ACCOUNT#: [id number]                       ADM DATE: 19
AGE: 78     : 44  SEX: M            ROOM/BED: D.2205    
AUTHOR: FLODOC                             PHYSICIAN:                               
 
REFERRING PHYSICIAN: NEEMA ATKINSON MD          
DATE OF SERVICE: 19
Discharge Plan
 
 
Patient Name: SAL DELGADO
Facility: [facility]
Encounter #: [id number]
Medical Record #: [id number]
: 1940
Planned Disposition: Home
Anticipated Discharge Date: 
 
Discharge Date: 2019
Expected LOS: 
Initial Reviewer: [id number]
Initial Review Date: 2019
Generated: 19   1:24 pm 
Comments
 
DCP- Discharge Planning
 
Updated by [email or web address]: Tess Eaton on 19  11:54 am CT
Patient Name: SAL DELGADO                                     
Admission Status: Elective   
Accout number: [id number]                              
Admission Date: 2019   
: 1940                                                        
Admission Diagnosis:   
Attending: NEEMA ATKINSON                                                
Current LOS:  2   
  
Anticipated DC Date:    
Planned Disposition: Home   
Primary Insurance: MEDICARE A & B   
  
  
Discharge Planning Comments:   
CM met with patient to assess discharge planning needs. Patient lives 
independently at home where he plans to return at discharge. His wife Aruna 
will be his  home. He states his home is safe to return. He denies any 
HH or DME needs at this time. IMM served and explained. He anticipates to be 
 
discharged tomorrow. CM will continue to follow and assist with DC planning 
as needed  
  
  
  
  
  
: Tess Eaton
 DCPIA - Discharge Planning Initial Assessment
 
Updated by [id number]: Tess Eaton on 19  12:52 pm
*  Is the patient Alert and Oriented?
Yes
*  How many steps to enter\exit or inside your home?
 
*  [facility]P
[facility]
*  Pharmacy
[facility]
*  Preadmission Environment
Home with Family
*  ADLs
Independent
*  Equipment
None
*  List name and contact numbers for known caregivers / representatives who 
currently or will assist patient after discharge:
ARUNA (WIFE) [phone]
*  Verbal permission to speak to the caregivers and representatives has been 
obtained from the patient.
N/A
*  Community resources currently utilized
None
*  Additional services required to return to the preadmission environment?
No
*  Can the patient safely return to the preadmission environment?
Yes
*  Has this patient been hospitalized within the prior 30 days at any 
hospital?
No
 
 
 
 
 
Coverage Notice
 
Reviewer: [id number] - Tess Eaton
 
Notice Issued Date-Time: 2019  12:00
Notice Type: IM Discharge Notice
 
 
Notice Delivered To: Patient
Relationship to Patient: 
Representative Name: 
 
Delivery Method: HAND - Hand Delivered
Brittny Days:
Prior Verbal Notification: 
 
Recipient Understood Notice: Yes
Recipient Signature: Yes
Med Rec Note Co-signed by Attending:
 
Coverage Notice Comment:  
 
Last DP export: 19  11:57 a
Patient Name: SAL DELGADO
Encounter #: [id number]
Page [id number]
 
 
 
 
 
Electronically Signed by ARTHUR ROSSI on 19 at 1224
 
 
 
 
 
 
**All edits/amendments must be made on the electronic document**
 
DICTATION DATE: 19     : FADY  19 1224     
RPT#: [id number]                                DC DATE:19
                                               STATUS: DIS IN  
[facility]
[address] [place] [address]
***END OF REPORT***

## 2019-02-26 LAB
ERYTHROCYTE [DISTWIDTH] IN BLOOD BY AUTOMATED COUNT: 12.7 % (ref 11.5–14.5)
HCT VFR BLD CALC: 45.4 % (ref 42–54)
HGB BLD-MCNC: 15.5 G/DL (ref 13.5–17.5)
MCH RBC QN AUTO: 33.2 PG (ref 26–34)
MCHC RBC AUTO-ENTMCNC: 34.1 G/DL (ref 31–37)
MCV RBC: 97.2 FL (ref 80–100)
PLATELET # BLD: 183 10X3/UL (ref 130–400)
PMV BLD AUTO: 9.7 FL (ref 7.4–10.4)
RBC # BLD AUTO: 4.67 10X6/UL (ref 4.2–6.1)
WBC # BLD AUTO: 9.1 10X3/UL (ref 4.8–10.8)

## 2019-02-28 ENCOUNTER — HOSPITAL ENCOUNTER (OUTPATIENT)
Dept: HOSPITAL 84 - D.OPS | Age: 79
Discharge: HOME | End: 2019-02-28
Attending: UROLOGY
Payer: MEDICARE

## 2019-02-28 VITALS
WEIGHT: 229 LBS | BODY MASS INDEX: 32.06 KG/M2 | HEIGHT: 71 IN | HEIGHT: 71 IN | WEIGHT: 229 LBS | BODY MASS INDEX: 32.06 KG/M2

## 2019-02-28 VITALS — SYSTOLIC BLOOD PRESSURE: 145 MMHG | DIASTOLIC BLOOD PRESSURE: 70 MMHG

## 2019-02-28 DIAGNOSIS — Z88.5: ICD-10-CM

## 2019-02-28 DIAGNOSIS — N13.8: ICD-10-CM

## 2019-02-28 DIAGNOSIS — R35.0: ICD-10-CM

## 2019-02-28 DIAGNOSIS — R39.15: ICD-10-CM

## 2019-02-28 DIAGNOSIS — N40.1: Primary | ICD-10-CM

## 2019-02-28 DIAGNOSIS — Z01.812: ICD-10-CM

## 2019-02-28 NOTE — OP
PATIENT NAME:  SAL DELGADO                            MEDICAL RECORD: [id number]
:44                                             LOCATION:D.OPS          
                                                         ADMISSION DATE:        
SURGEON:  GERARD VILLAREAL MD              
 
 
DATE OF OPERATION:  2019
 
SURGEON:  Gerard Villareal MD
 
ANESTHESIA:  TIVA by Gerald Goetz CRNA
 
DIAGNOSES:  Obstructive benign prostatic hyperplasia.  MARQUES 40 mL prostate.  IPSS
is 21.  Quality of life score is 5.
 
FINDINGS:  UroLift with 5 implants deployed, 3 remained in the patient, 2 are in
the left side and there is one on the right verumontanum level.
 
FINDINGS:  Bilateral lateral lobe hyperplasia, which is primarily obstructive
towards the apex.  Single ureteral orifices bilaterally with no bladder tumors.
 
BLOOD LOSS:  Minimal.
 
CLINICAL HISTORY:  This is a 78-year-old male with difficulty voiding.  He has
urinary urgency and frequency also.  He had cystoscopy, which showed obstructive
BPH.  He comes today to have the UroLift procedure done.  HE IS ALLERGIC TO
MORPHINE.  He was given Ancef on call to the OR.
 
DESCRIPTION OF PROCEDURE:  We gave the patient IV sedation.  He was then placed
in the dorsal lithotomy position and prepped and draped.  Cystoscopy showed an
obstructive bilateral lateral lobes primarily near the apex.  As we went towards
the bladder neck, the bladder neck was relatively open.  The prostatic urethra
was short in length.  The bladder was normal without any lesions.  Single
ureteral orifices are seen on each side.  I initially placed the 2 atypical
units at the level of the verumontanum.  These were placed at the anterior
lateral wall.  One unit was placed on each side.  I then placed a left bladder
neck level unit about 1.5 cm distal to the bladder neck.  This also held without
any difficulty.  I attempted twice to place a bladder neck unit on the right
side, but both times I hit bone or some other structure on the opposite side of
the prostate, which prevented the unit from firing properly.  As the urethra was
actually quite wide open, at this point I decided to abandon further attempts to
place the unit into the right bladder neck level.
 
TRANSINT:[id number] Voice Confirmation ID: [id number] DOCUMENT ID: [id number]
                                           
                                           GERARD VILLAREAL MD              
 
 
 
Electronically Signed by GERARD VILLAREAL on 19 at 1006
CC:                                                             [id number]
DICTATION DATE: 19     :     19      PRE [facility]                                          
 [facility]

## 2019-03-28 ENCOUNTER — HOSPITAL ENCOUNTER (OUTPATIENT)
Dept: HOSPITAL 84 - D.RT | Age: 79
Discharge: HOME | End: 2019-03-28
Attending: INTERNAL MEDICINE
Payer: MEDICARE

## 2019-03-28 VITALS — BODY MASS INDEX: 32 KG/M2

## 2019-03-28 DIAGNOSIS — R93.89: Primary | ICD-10-CM

## 2019-06-10 ENCOUNTER — HOSPITAL ENCOUNTER (OUTPATIENT)
Dept: HOSPITAL 84 - D.RAD | Age: 79
Discharge: HOME | End: 2019-06-10
Attending: INTERNAL MEDICINE
Payer: MEDICARE

## 2019-06-10 VITALS — BODY MASS INDEX: 32 KG/M2

## 2019-06-10 DIAGNOSIS — R13.10: Primary | ICD-10-CM

## 2019-12-20 ENCOUNTER — HOSPITAL ENCOUNTER (OUTPATIENT)
Dept: HOSPITAL 84 - D.RAD | Age: 79
Discharge: HOME | End: 2019-12-20
Attending: NURSE PRACTITIONER
Payer: MEDICARE

## 2019-12-20 VITALS — BODY MASS INDEX: 32 KG/M2

## 2019-12-20 DIAGNOSIS — M13.851: Primary | ICD-10-CM

## 2021-05-07 LAB
ANION GAP SERPL CALC-SCNC: 10.2 MMOL/L (ref 8–16)
APTT BLD: 27.3 SECONDS (ref 22.8–39.4)
BACTERIA #/AREA URNS HPF: (no result) HPF
BASOPHILS NFR BLD AUTO: 0.3 % (ref 0–2)
BILIRUB SERPL-MCNC: NEGATIVE MG/DL
BUN SERPL-MCNC: 14 MG/DL (ref 7–18)
CALCIUM SERPL-MCNC: 8.8 MG/DL (ref 8.5–10.1)
CHLORIDE SERPL-SCNC: 101 MMOL/L (ref 98–107)
CO2 SERPL-SCNC: 30.9 MMOL/L (ref 21–32)
CREAT SERPL-MCNC: 1.3 MG/DL (ref 0.6–1.3)
EOSINOPHIL NFR BLD: 8.4 % (ref 0–7)
ERYTHROCYTE [DISTWIDTH] IN BLOOD BY AUTOMATED COUNT: 12.4 % (ref 11.5–14.5)
GLUCOSE SERPL-MCNC: 111 MG/DL (ref 74–106)
HCT VFR BLD CALC: 46.9 % (ref 42–54)
HGB BLD-MCNC: 15.5 G/DL (ref 13.5–17.5)
IMM GRANULOCYTES NFR BLD: 0.2 % (ref 0–5)
INR PPP: 1.03 (ref 0.85–1.17)
KETONES UR STRIP-MCNC: NEGATIVE MG/DL
LYMPHOCYTES # BLD: 1.51 10X3/UL (ref 1.32–3.57)
LYMPHOCYTES NFR BLD AUTO: 24.3 % (ref 15–50)
MCH RBC QN AUTO: 33.2 PG (ref 26–34)
MCHC RBC AUTO-ENTMCNC: 33 G/DL (ref 31–37)
MCV RBC: 100.4 FL (ref 80–100)
MONOCYTES NFR BLD: 7.2 % (ref 2–11)
NEUTROPHILS # BLD: 3.7 10X3/UL (ref 1.78–5.38)
NEUTROPHILS NFR BLD AUTO: 59.6 % (ref 40–80)
NITRITE UR-MCNC: NEGATIVE MG/ML
OSMOLALITY SERPL CALC.SUM OF ELEC: 277 MOSM/KG (ref 275–300)
PH UR STRIP: 6 [PH] (ref 5–8)
PLATELET # BLD: 200 10X3/UL (ref 130–400)
PMV BLD AUTO: 9.7 FL (ref 7.4–10.4)
POTASSIUM SERPL-SCNC: 4.1 MMOL/L (ref 3.5–5.1)
PROTHROMBIN TIME: 12.5 SECONDS (ref 11.6–15)
RBC # BLD AUTO: 4.67 10X6/UL (ref 4.2–6.1)
SODIUM SERPL-SCNC: 138 MMOL/L (ref 136–145)
SQUAMOUS #/AREA URNS HPF: (no result) HPF (ref 0–4)
UROBILINOGEN UR-MCNC: NORMAL MG/DL (ref ?–2)
WBC # BLD AUTO: 6.2 10X3/UL (ref 4.8–10.8)
WBC #/AREA URNS HPF: (no result) HPF (ref 0–1)

## 2021-05-11 ENCOUNTER — HOSPITAL ENCOUNTER (OUTPATIENT)
Dept: HOSPITAL 84 - D.M3 | Age: 81
Setting detail: OBSERVATION
LOS: 1 days | Discharge: HOME | End: 2021-05-12
Attending: ORTHOPAEDIC SURGERY | Admitting: ORTHOPAEDIC SURGERY
Payer: MEDICARE

## 2021-05-11 VITALS — DIASTOLIC BLOOD PRESSURE: 98 MMHG | SYSTOLIC BLOOD PRESSURE: 117 MMHG

## 2021-05-11 VITALS — DIASTOLIC BLOOD PRESSURE: 114 MMHG | SYSTOLIC BLOOD PRESSURE: 139 MMHG

## 2021-05-11 VITALS
BODY MASS INDEX: 31.5 KG/M2 | BODY MASS INDEX: 31.5 KG/M2 | HEIGHT: 71 IN | BODY MASS INDEX: 31.5 KG/M2 | WEIGHT: 225 LBS | BODY MASS INDEX: 31.5 KG/M2 | HEIGHT: 71 IN | WEIGHT: 225 LBS

## 2021-05-11 VITALS — DIASTOLIC BLOOD PRESSURE: 58 MMHG | SYSTOLIC BLOOD PRESSURE: 116 MMHG

## 2021-05-11 VITALS — SYSTOLIC BLOOD PRESSURE: 115 MMHG | DIASTOLIC BLOOD PRESSURE: 65 MMHG

## 2021-05-11 VITALS — SYSTOLIC BLOOD PRESSURE: 134 MMHG | DIASTOLIC BLOOD PRESSURE: 54 MMHG

## 2021-05-11 VITALS — DIASTOLIC BLOOD PRESSURE: 46 MMHG | SYSTOLIC BLOOD PRESSURE: 117 MMHG

## 2021-05-11 VITALS — DIASTOLIC BLOOD PRESSURE: 75 MMHG | SYSTOLIC BLOOD PRESSURE: 143 MMHG

## 2021-05-11 VITALS — DIASTOLIC BLOOD PRESSURE: 65 MMHG | SYSTOLIC BLOOD PRESSURE: 130 MMHG

## 2021-05-11 DIAGNOSIS — F32.9: ICD-10-CM

## 2021-05-11 DIAGNOSIS — K57.92: ICD-10-CM

## 2021-05-11 DIAGNOSIS — Z96.651: ICD-10-CM

## 2021-05-11 DIAGNOSIS — E78.5: ICD-10-CM

## 2021-05-11 DIAGNOSIS — M23.51: Primary | ICD-10-CM

## 2021-05-11 DIAGNOSIS — N40.0: ICD-10-CM

## 2021-05-11 DIAGNOSIS — M16.11: ICD-10-CM

## 2021-05-11 DIAGNOSIS — D64.9: ICD-10-CM

## 2021-05-11 DIAGNOSIS — I10: ICD-10-CM

## 2021-05-11 NOTE — NUR
pt placed on cpm machine. ice bag to knee. call light in reach and fall alarm
on and active. has not voided yet but urinal in reach

## 2021-05-11 NOTE — NUR
ALERT RESTING IN BED, CPM IN USE DENIES PAIN OR NEEDS AT THIS TIME, SEE SHIFT
ASSESSMENT CALL LIGHT IN REACH

## 2021-05-11 NOTE — MORECARE
CASE MANAGEMENT DISCHARGE SUMMARY
 
 
PATIENT: SAL EDDY                      UNIT: [id number]
ACCOUNT#: [id number]                       ADM DATE: 21
AGE: 80     : 42  SEX: M            ROOM/BED: D.1209    
AUTHOR: ARTHUR ROSSI                             PHYSICIAN:                               
 
REFERRING PHYSICIAN: QUIRINO CABRERA DO         
DATE OF SERVICE: 21
Case Management Discharge Planning Summary
 
 
COMMENTS 
ENTERED DATE:  21  18:44 CT
COMMENT TYPE:  Discharge Planning
REVIEWER: Yony Suárez
CM met with patient to complete DC plan and to evaluate needs.  Patient 
lives independently with his wife, Aruna Eddy, [phone].  Patient 
stated that his home is safe and has electricity and running water.  Patient 
stated that he has no problems paying for medications and he fills his 
medications at [facility].  Patient stated that 
his primary care physician is Dr. Pardo.  At discharge, the patient plans 
to return home and feels this is a safe discharge.  CM discussed 
availability of home health, rehab services, and medical equipment.  Patient 
declined HHS, SNF, IPR, and DME.  Patient would like outpatient PT with 
[facility] next to [facility].  Patient stated that he his CPM and 
Walker were delivered before his surgery.  Patient stated that he has a 
Shower Bench.  VINOD signed and placed in chart.  Patient voiced no other 
needs at this time and is satisfied with DC plan.  Transportation provider 
at discharge will be with his wife, Jolene VIRK delivered, explained, 
signed by the patient, and placed in chart. Signed form also left with the 
patient.  CM will continue to follow and will assist as needed with dc 
plans/needs.
 
 
DCP REVIEW SUMMARY
ANTICIPATED D/C DATE: 
EXPECTED LOS : 
CASE STATUS:  DCP Initiated
INITIAL REVIEW:  2021
INITIAL REVIEWER:   Yony Suárez
FINAL DISCHARGE DISPOSITION:  : 
FINAL REVIEWER:  
FINAL REVIEW DATE: 
 
  
 
DCP Focus Questions & Answers
 
 
DCP Evaluation
QUESTION: ANSWER
Patient's ability to cope with chronic illness : d. No chronic illness
Patient gives permission to discuss discharge plans with:  (name, 
relationship and number) : wifeAruna, [phone]
Patient and/or caregiver agree upon recommended discharge plan? : Yes
Family / Caregiver's ability to cope with chronic illness: : a. Adequate 
(ability to meet patient's medical needs, ensures patient attends medical 
appts.)
Patient's current cognitive status: : *Oriented to person, place, situation, 
time and present
Family / Caregiver's ability to cope with chronic illness: : a. Adequate 
(ability to meet patient's medical needs, ensures patient attends medical 
appts.)
Physical Status: : Independent with ADL's
Does the patient have the ability to pay for or attain post discharge needs 
/ services? : Yes
Functional screen assessment: : Basic needs can adequately be met by self
Living Arrangements: : Home with Spouse/Significant Other
Equipment needed for post hospitalization: : None
Is there a likelihood that the patient will require additional services to 
return to the preadmission environment? : No
Baseline cognitive status: : *Oriented to person, place, situation, time and 
present
Patient with capacity for self-care or can be cared for in same environment 
as prior to hospitalization? : Yes
Physical environment modification needed / anticipated for discharge: : No
Medication Management: : Patient states can read and understand medication 
labels
Medication Management: : Patient states can afford medications
Pharmacy name(s): : [facility]
Does Patient have transportation to get home and to follow-up medical 
appointments when discharged from the hospital? : Yes
Would patient like to participate in any Care Coordination programs (if 
applicable): : Not applicable
Does the patient have electricity at home? : Yes
Does the patient have running water in their house? : Yes
Equipment in use: : Shower Chair
Mental health screen: : No mental health history
 
 
 
DCP Re-evaluation
QUESTION: ANSWER
Would patient like to participate in any Care Coordination programs (if 
applicable): : Not applicable
 
 
 
 
 
 
 
 
PATIENT:  SAL EDDY
ENCOUNTER:  [id number]
MEDICAL RECORD#:  [id number]
ADMISSION DATE:  2021
DISCHARGE DATE:  
ATTENDING MD:  QUIRINO PERERA
:   1940-Dec-03
AGE:  80
MARITAL STATUS:   M
DC PLAN ID:  [id number]
FACILITY:   [facility]
PRINTED ON: 21  18:50  CT
 
 
 
 
 
 
 
 
**All edits/amendments must be made on the electronic document**
 
DICTATION DATE: 21     : FADY  21     
RPT#: [id number]                                DC DATE:        
                                               STATUS: ADM IN  
[facility]
 [place] [address]
***END OF REPORT***

## 2021-05-11 NOTE — MORECARE
CASE MANAGEMENT DISCHARGE SUMMARY
 
 
PATIENT: SAL DELGADO                      UNIT: [id number]
ACCOUNT#: [id number]                       ADM DATE: 21
AGE: 80     : 44  SEX: M            ROOM/BED: D.1209    
AUTHOR: FLO,ARTHUR                             PHYSICIAN:                               
 
REFERRING PHYSICIAN: QUIRINO CABRERA DO         
DATE OF SERVICE: 21
Case Management Discharge Planning Summary
 
 
 
 
 
DCP REVIEW SUMMARY
ANTICIPATED D/C DATE: 
EXPECTED LOS : 
CASE STATUS:  DCP Initiated
INITIAL REVIEW:  2021
INITIAL REVIEWER:   Yony Suárez
FINAL DISCHARGE DISPOSITION:  : 
FINAL REVIEWER:  
FINAL REVIEW DATE: 
 
  
 
DCP Focus Questions & Answers
 
 
QUESTION: ANSWER
 : 
 
 
 
 
 
PATIENT:  SAL DELGADO
ENCOUNTER:  [id number]
MEDICAL RECORD#:  [id number]
ADMISSION DATE:  2021
DISCHARGE DATE:  
ATTENDING MD:  QUIRINO PERERA
:   1940-Dec-03
AGE:  80
MARITAL STATUS:   M
DC PLAN ID:  [id number]
 
FACILITY:   [facility]
PRINTED ON: 21  18:39  CT
 
 
 
 
 
 
 
 
**All edits/amendments must be made on the electronic document**
 
DICTATION DATE: 21     : DM  21     
RPT#: [id number]                                DC DATE:        
                                               STATUS: ADM IN  
[facility]
 [place] [address]
***END OF REPORT***

## 2021-05-12 VITALS — SYSTOLIC BLOOD PRESSURE: 172 MMHG | DIASTOLIC BLOOD PRESSURE: 79 MMHG

## 2021-05-12 VITALS — DIASTOLIC BLOOD PRESSURE: 74 MMHG | SYSTOLIC BLOOD PRESSURE: 156 MMHG

## 2021-05-12 VITALS — SYSTOLIC BLOOD PRESSURE: 145 MMHG | DIASTOLIC BLOOD PRESSURE: 82 MMHG

## 2021-05-12 VITALS — DIASTOLIC BLOOD PRESSURE: 67 MMHG | SYSTOLIC BLOOD PRESSURE: 160 MMHG

## 2021-05-12 LAB
ALBUMIN SERPL-MCNC: 3.1 G/DL (ref 3.4–5)
ALP SERPL-CCNC: 225 U/L (ref 30–120)
ALT SERPL-CCNC: 15 U/L (ref 10–68)
ANION GAP SERPL CALC-SCNC: 13.5 MMOL/L (ref 8–16)
BASOPHILS NFR BLD AUTO: 0.2 % (ref 0–2)
BILIRUB SERPL-MCNC: 0.38 MG/DL (ref 0.2–1.3)
BUN SERPL-MCNC: 11 MG/DL (ref 7–18)
CALCIUM SERPL-MCNC: 8.3 MG/DL (ref 8.5–10.1)
CHLORIDE SERPL-SCNC: 104 MMOL/L (ref 98–107)
CO2 SERPL-SCNC: 25.8 MMOL/L (ref 21–32)
CREAT SERPL-MCNC: 1 MG/DL (ref 0.6–1.3)
EOSINOPHIL NFR BLD: 8.3 % (ref 0–7)
ERYTHROCYTE [DISTWIDTH] IN BLOOD BY AUTOMATED COUNT: 12.2 % (ref 11.5–14.5)
GLOBULIN SER-MCNC: 2.7 G/L
GLUCOSE SERPL-MCNC: 83 MG/DL (ref 74–106)
HCT VFR BLD CALC: 39.7 % (ref 42–54)
HGB BLD-MCNC: 12.9 G/DL (ref 13.5–17.5)
IMM GRANULOCYTES NFR BLD: 0.2 % (ref 0–5)
LYMPHOCYTES # BLD: 1.47 10X3/UL (ref 1.32–3.57)
LYMPHOCYTES NFR BLD AUTO: 23.5 % (ref 15–50)
MCH RBC QN AUTO: 32.6 PG (ref 26–34)
MCHC RBC AUTO-ENTMCNC: 32.5 G/DL (ref 31–37)
MCV RBC: 100.3 FL (ref 80–100)
MONOCYTES NFR BLD: 7.8 % (ref 2–11)
NEUTROPHILS # BLD: 3.75 10X3/UL (ref 1.78–5.38)
NEUTROPHILS NFR BLD AUTO: 60 % (ref 40–80)
OSMOLALITY SERPL CALC.SUM OF ELEC: 275 MOSM/KG (ref 275–300)
PLATELET # BLD: 159 10X3/UL (ref 130–400)
PMV BLD AUTO: 10.2 FL (ref 7.4–10.4)
POTASSIUM SERPL-SCNC: 4.3 MMOL/L (ref 3.5–5.1)
PROT SERPL-MCNC: 5.8 G/DL (ref 6.4–8.2)
RBC # BLD AUTO: 3.96 10X6/UL (ref 4.2–6.1)
SODIUM SERPL-SCNC: 139 MMOL/L (ref 136–145)
WBC # BLD AUTO: 6.3 10X3/UL (ref 4.8–10.8)

## 2021-05-12 NOTE — OP
PATIENT NAME:  SAL EDDY                            MEDICAL RECORD: [id number]
:44                                             LOCATION:D.M3     D.1209
                                                         ADMISSION DATE:21
SURGEON:  SANG CABRERA DO         
 
 
DATE OF OPERATION:  2021
 
PROCEDURE PERFORMED:  Right total knee revision with poly swab and a partial
synovectomy.
 
PREOPERATIVE DIAGNOSIS:  Instability of right knee.
 
POSTOPERATIVE DIAGNOSIS:  Instability of right knee.
 
INDICATIONS:  Mr. Eddy is a 80-year-old male who has had right knee pain for
quite some time and I did his right hip and he says his knee hurts him more than
his hip did.  He complained of instability, felt it could give out on him.  I
tested him and he did not have much flexion past 90, otherwise he had full
extension.  I told him that I would swab the poly out as he had instability in
mid flexion and flexion, some.  He was aware of the risks of this including
infection, bleeding, damage to nerves and vessels, need for further surgery,
continued pain, continued loss of motion, fracture, failure of implants and even
blood clots, death and he signed the consent.
 
SURGEON:  Sang Cabrera DO
 
DESCRIPTION OF PROCEDURE:  The patient was taken to the operative suite, given a
spinal and adductor canal block, given 2 grams of Ancef, 80 mg gentamicin, and a
gram of TXA.  The right lower extremity was then prepped and draped in sterile
fashion.  Timeout was performed.  Everyone was in agreement with the correct
site, side, patient, and procedure.  I then began by making an incision, marked
out an incision over the old incision covered with an Ioban.  I then made an
incision with a #10 blade scalpel down to the capsule in a medial parapatellar
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
itself.  I then proceeded to remove that with a rongeur and Bovie.  Once I
removed the metalosis reaction and synovium I ended up doing a partial
synovectomy and then removed the poly, was an 11 poly and then put in a 13
trial, 13 trial fit very well and it flexed to just about 90 degrees and
extended fully.  He had good stability in flexion and extension and mid flexion.
 I took the trial out and then irrigated and then put in the deep 13 poly from
[employer] and locked it into place.  I then irrigated with 10% povidone
iodine and 500 mL saline solution and put in the joint cocktail minus the
morphine due to his allergy.  I then irrigated that out with over a liter of
normal saline, 10% povidone iodine and then closed the capsule, put in Svetlana,
vancomycin, and tobramycin powder and closed the capsule with #1 Vicryl in a
figure-of-eight fashion.  Gregor Richardson, certified surgical first assist, closed
the capsule with a running Stratafix and the skin with 2-0 Vicryl in interrupted
fashion and put on a ZipLine, Adaptic, 4 x 4s, ABD, Webril, Ace wrap, and DENIS
hose stockings awakened and taken to recovery in stable condition.
 
BLOOD LOSS:  Minimal.
 
COMPLICATIONS:  None.
 
TRANSINT:[id number] Voice Confirmation ID: [id number] DOCUMENT ID: [id number]
 
 
 
OPERATIVE REPORT                               [id number]    SAL EDDY MICHAEL D, DO         
 
 
 
Electronically Signed by SANG LEONARD on 21 at 0827
 
 
 
 
 
 
 
 
 
 
 
 
 
 
 
 
 
 
 
 
 
 
 
 
 
 
 
 
 
 
 
 
 
 
 
 
 
 
 
 
 
CC:                                                             [id number]
DICTATION DATE: 21 1552     :     21 0018      ADM IN  
                                                                              
[facility]                                          
[address] [place] [address]

## 2021-05-12 NOTE — NUR
DISCHARGED TO HOME AMBULATORY WITH WIFE. DRESSING TO RIGHT KNEE CHANGED PER
ORDERS. INCISION IS CLEAN AND DRY, WELL APPROXIMATED. WIFE INSTRUCTED IN
CHANGES. DISCHARGE INSTRUCTIONS GIVEN BOTH VERBALLY AND WRITTEN. ALL QUESTIONS
ANSWERED. PATIENT AND WIFE VERBALIZED UNDERSTANDING OF SAME. IV TO LEFT
FOREARM D/C WITH CATHETER INTACT. PATIENT GIVEN 4 EXTRA DRESSINGS FOR HOME
USE. NEEDED PRESCRIPTIONS GIVEN TO PATIENT. ALL BELONGINGS WITH PATIENT.

## 2021-05-12 NOTE — MORECARE
CASE MANAGEMENT DISCHARGE SUMMARY
 
 
PATIENT: SAL EDDY                      UNIT: [id number]
ACCOUNT#: [id number]                       ADM DATE: 21
AGE: 80     : 42  SEX: M            ROOM/BED: D.1209    
AUTHOR: FLO,DOC                             PHYSICIAN:                               
 
REFERRING PHYSICIAN: QUIRINO CABRERA DO         
DATE OF SERVICE: 21
Case Management Discharge Planning Summary
 
 
COMMENTS 
ENTERED DATE:  21  17:07 CT
COMMENT TYPE:  Discharge Planning
REVIEWER: Yi Murillo
CM called [facility] and schedule patient therapy 
appointment for Monday 11:00. Faxed orders / records as requested. CM met 
with patient and instructed him on therapy appointment. Patient verbalized 
understanding and satisfaction with discharge plan. CM gave patient 
photocopy of scheduled appointment. Denies any other discharge planning 
needs at this time.
__________________________________________________
ENTERED DATE:  21  18:44 CT
COMMENT TYPE:  Discharge Planning
REVIEWER: Yony Suárez
CM met with patient to complete DC plan and to evaluate needs.  Patient 
lives independently with his wife, Aruna Eddy, [phone].  Patient 
stated that his home is safe and has electricity and running water.  Patient 
stated that he has no problems paying for medications and he fills his 
medications at [facility].  Patient stated that 
his primary care physician is Dr. Pardo.  At discharge, the patient plans 
to return home and feels this is a safe discharge.  CM discussed 
availability of home health, rehab services, and medical equipment.  Patient 
declined HHS, SNF, IPR, and DME.  Patient would like outpatient PT with 
[facility] next to [facility].  Patient stated that he his CPM and 
Walker were delivered before his surgery.  Patient stated that he has a 
Shower Bench.  VINOD signed and placed in chart.  Patient voiced no other 
needs at this time and is satisfied with DC plan.  Transportation provider 
at discharge will be with his wife, Aruna.  VIRK delivered, explained, 
signed by the patient, and placed in chart. Signed form also left with the 
patient.  CM will continue to follow and will assist as needed with dc 
plans/needs.
 
 
DCP REVIEW SUMMARY
ANTICIPATED D/C DATE: 
EXPECTED LOS : 
CASE STATUS:  DCP Initiated
 
INITIAL REVIEW:  2021
INITIAL REVIEWER:   Yony Suárez
FINAL DISCHARGE DISPOSITION:  : 
FINAL REVIEWER:  
FINAL REVIEW DATE: 
 
  
 
DCP Focus Questions & Answers
 
DCP Evaluation
QUESTION: ANSWER
Patient's current cognitive status: : *Oriented to person, place, situation, 
time and present
Family / Caregiver's ability to cope with chronic illness: : a. Adequate 
(ability to meet patient's medical needs, ensures patient attends medical 
appts.)
Patient and/or caregiver agree upon recommended discharge plan? : Yes
Patient gives permission to discuss discharge plans with:  (name, 
relationship and number) : wife, Aruna Eddy, [phone]
Patient's ability to cope with chronic illness : d. No chronic illness
Functional screen assessment: : Basic needs can adequately be met by self
Does the patient have the ability to pay for or attain post discharge needs 
/ services? : Yes
Physical Status: : Independent with ADL's
Family / Caregiver's ability to cope with chronic illness: : a. Adequate 
(ability to meet patient's medical needs, ensures patient attends medical 
appts.)
Is there a likelihood that the patient will require additional services to 
return to the preadmission environment? : No
Equipment needed for post hospitalization: : None
Living Arrangements: : Home with Spouse/Significant Other
Patient with capacity for self-care or can be cared for in same environment 
as prior to hospitalization? : Yes
Baseline cognitive status: : *Oriented to person, place, situation, time and 
present
Physical environment modification needed / anticipated for discharge: : No
Medication Management: : Patient states can afford medications
Medication Management: : Patient states can read and understand medication 
labels
Pharmacy name(s): : [facility]
Does Patient have transportation to get home and to follow-up medical 
appointments when discharged from the hospital? : Yes
Would patient like to participate in any Care Coordination programs (if 
applicable): : Not applicable
Does the patient have electricity at home? : Yes
Does the patient have running water in their house? : Yes
Equipment in use: : Shower Chair
Mental health screen: : No mental health history
 
 
 
 
DCP Re-evaluation
QUESTION: ANSWER
Would patient like to participate in any Care Coordination programs (if 
applicable): : Not applicable
 
 
 
 
 
 
 
PATIENT:  SAL EDDY
ENCOUNTER:  [id number]
MEDICAL RECORD#:  [id number]
ADMISSION DATE:  2021
DISCHARGE DATE:  
ATTENDING MD:  QUIRINO PERERA
:   1940-Dec-03
AGE:  80
MARITAL STATUS:   M
DC PLAN ID:  [id number]
FACILITY:   [facility]
PRINTED ON: 21  17:13  CT
 
 
 
 
 
 
 
 
**All edits/amendments must be made on the electronic document**
 
DICTATION DATE: 21     : FADY  21     
RPT#: [id number]                                DC DATE:        
                                               STATUS: ADM IN  
[facility]
 [place] [address]
***END OF REPORT***

## 2021-05-12 NOTE — MORECARE
CASE MANAGEMENT DISCHARGE SUMMARY
 
 
PATIENT: SAL EDDY                      UNIT: [id number]
ACCOUNT#: [id number]                       ADM DATE: 21
AGE: 80     : 42  SEX: M            ROOM/BED: D.1209    
AUTHOR: FLO,DOC                             PHYSICIAN:                               
 
REFERRING PHYSICIAN: QUIRINO CABRERA DO         
DATE OF SERVICE: 21
Case Management Discharge Planning Summary
 
 
COMMENTS 
ENTERED DATE:  21  17:07 CT
COMMENT TYPE:  Discharge Planning
REVIEWER: Yi Murillo
CM called [facility] and schedule patient therapy 
appointment for Monday 11:00. Faxed orders / records as requested. CM met 
with patient and instructed him on therapy appointment. Patient verbalized 
understanding and satisfaction with discharge plan. CM gave patient 
photocopy of scheduled appointment. Denies any other discharge planning 
needs at this time.
__________________________________________________
ENTERED DATE:  21  18:44 CT
COMMENT TYPE:  Discharge Planning
REVIEWER: Yony Suárez
CM met with patient to complete DC plan and to evaluate needs.  Patient 
lives independently with his wife, Aruna Eddy, [phone].  Patient 
stated that his home is safe and has electricity and running water.  Patient 
stated that he has no problems paying for medications and he fills his 
medications at [facility].  Patient stated that 
his primary care physician is Dr. Pardo.  At discharge, the patient plans 
to return home and feels this is a safe discharge.  CM discussed 
availability of home health, rehab services, and medical equipment.  Patient 
declined HHS, SNF, IPR, and DME.  Patient would like outpatient PT with 
[facility] next to [facility].  Patient stated that he his CPM and 
Walker were delivered before his surgery.  Patient stated that he has a 
Shower Bench.  VINOD signed and placed in chart.  Patient voiced no other 
needs at this time and is satisfied with DC plan.  Transportation provider 
at discharge will be with his wife, Aruna.  VIRK delivered, explained, 
signed by the patient, and placed in chart. Signed form also left with the 
patient.  CM will continue to follow and will assist as needed with dc 
plans/needs.
 
 
DCP REVIEW SUMMARY
ANTICIPATED D/C DATE: 
EXPECTED LOS : 
CASE STATUS:  DCP Initiated
 
INITIAL REVIEW:  2021
INITIAL REVIEWER:   Yony Suárez
FINAL DISCHARGE DISPOSITION:  : 
FINAL REVIEWER:  
FINAL REVIEW DATE: 
 
  
 
DCP Focus Questions & Answers
 
DCP Evaluation
QUESTION: ANSWER
Patient's current cognitive status: : *Oriented to person, place, situation, 
time and present
Family / Caregiver's ability to cope with chronic illness: : a. Adequate 
(ability to meet patient's medical needs, ensures patient attends medical 
appts.)
Patient and/or caregiver agree upon recommended discharge plan? : Yes
Patient gives permission to discuss discharge plans with:  (name, 
relationship and number) : wife, Aruna Eddy, [phone]
Patient's ability to cope with chronic illness : d. No chronic illness
Functional screen assessment: : Basic needs can adequately be met by self
Does the patient have the ability to pay for or attain post discharge needs 
/ services? : Yes
Physical Status: : Independent with ADL's
Family / Caregiver's ability to cope with chronic illness: : a. Adequate 
(ability to meet patient's medical needs, ensures patient attends medical 
appts.)
Is there a likelihood that the patient will require additional services to 
return to the preadmission environment? : No
Equipment needed for post hospitalization: : None
Living Arrangements: : Home with Spouse/Significant Other
Patient with capacity for self-care or can be cared for in same environment 
as prior to hospitalization? : Yes
Baseline cognitive status: : *Oriented to person, place, situation, time and 
present
Physical environment modification needed / anticipated for discharge: : No
Medication Management: : Patient states can afford medications
Medication Management: : Patient states can read and understand medication 
labels
Pharmacy name(s): : [email or web address]
Does Patient have transportation to get home and to follow-up medical 
appointments when discharged from the hospital? : Yes
Would patient like to participate in any Care Coordination programs (if 
applicable): : Not applicable
Does the patient have electricity at home? : Yes
Does the patient have running water in their house? : Yes
Equipment in use: : Shower Chair
Mental health screen: : No mental health history
 
 
 
 
DCP Re-evaluation
QUESTION: ANSWER
Would patient like to participate in any Care Coordination programs (if 
applicable): : Not applicable
 
 
 
 
 
 
 
PATIENT:  SAL EDDY
ENCOUNTER:  [id number]
MEDICAL RECORD#:  [id number]
ADMISSION DATE:  2021
DISCHARGE DATE:  2021
ATTENDING MD:  QUIRINO PERERA
:   1940-Dec-03
AGE:  80
MARITAL STATUS:   M
DC PLAN ID:  [id number]
FACILITY:   [facility]
PRINTED ON: 21  17:38  CT
 
 
 
 
 
 
 
 
**All edits/amendments must be made on the electronic document**
 
DICTATION DATE: 21     : FADY  21     
RPT#: [id number]                                DC DATE:21
                                               STATUS: DIS IN  
[facility]
[address] [place] [address]
***END OF REPORT***

## 2021-05-12 NOTE — NUR
AWAKE AND ALERT. ORIENTED X3. LUNGS ARE CLEAR BILATERALLY, NO COUGH NOTED.
SKIN IS INTACT WITHOUT REDNESS EXCEPT INCISION TO RIGHT KNEE WHICH HAS A DRY
INTACT DRESSING IN PLCAE. IV TO LEFT FOREARM IS PATENT WITHOUT REDNESS AT
INSERTION SITE. VOIDED CLEAR YELLOW URINE IN URINAL. DENIES NEEDS. ON CPM AT
THIS TIME.

## 2021-05-12 NOTE — MORECARE
CASE MANAGEMENT DISCHARGE SUMMARY
 
 
PATIENT: SAL EDDY                      UNIT: [id number]
ACCOUNT#: [id number]                       ADM DATE: 21
AGE: 80     : 42  SEX: M            ROOM/BED: D.1209    
AUTHOR: FLO,ARTHUR                             PHYSICIAN:                               
 
REFERRING PHYSICIAN: QUIRINO CABRERA DO         
DATE OF SERVICE: 21
Case Management Discharge Planning Summary
 
 
COMMENTS 
ENTERED DATE:  21  18:44 CT
COMMENT TYPE:  Discharge Planning
REVIEWER: Yony Suárez
CM met with patient to complete DC plan and to evaluate needs.  Patient 
lives independently with his wife, Aruna Eddy, [phone].  Patient 
stated that his home is safe and has electricity and running water.  Patient 
stated that he has no problems paying for medications and he fills his 
medications at [facility].  Patient stated that 
his primary care physician is Dr. Pardo.  At discharge, the patient plans 
to return home and feels this is a safe discharge.  CM discussed 
availability of home health, rehab services, and medical equipment.  Patient 
declined HHS, SNF, IPR, and DME.  Patient would like outpatient PT with 
[facility] next to [facility].  Patient stated that he his CPM and 
Walker were delivered before his surgery.  Patient stated that he has a 
Shower Bench.  VINOD signed and placed in chart.  Patient voiced no other 
needs at this time and is satisfied with DC plan.  Transportation provider 
at discharge will be with his wife, Jolene VIRK delivered, explained, 
signed by the patient, and placed in chart. Signed form also left with the 
patient.  CM will continue to follow and will assist as needed with dc 
plans/needs.
 
 
DCP REVIEW SUMMARY
ANTICIPATED D/C DATE: 
EXPECTED LOS : 
CASE STATUS:  DCP Initiated
INITIAL REVIEW:  2021
INITIAL REVIEWER:   Yony Suárez
FINAL DISCHARGE DISPOSITION:  : 
FINAL REVIEWER:  
FINAL REVIEW DATE: 
 
  
 
DCP Focus Questions & Answers
 
 
DCP Evaluation
QUESTION: ANSWER
Patient's current cognitive status: : *Oriented to person, place, situation, 
time and present
Family / Caregiver's ability to cope with chronic illness: : a. Adequate 
(ability to meet patient's medical needs, ensures patient attends medical 
appts.)
Patient and/or caregiver agree upon recommended discharge plan? : Yes
Patient gives permission to discuss discharge plans with:  (name, 
relationship and number) : wife, Aruna Eddy, [phone]
Patient's ability to cope with chronic illness : d. No chronic illness
Functional screen assessment: : Basic needs can adequately be met by self
Does the patient have the ability to pay for or attain post discharge needs 
/ services? : Yes
Physical Status: : Independent with ADL's
Family / Caregiver's ability to cope with chronic illness: : a. Adequate 
(ability to meet patient's medical needs, ensures patient attends medical 
appts.)
Is there a likelihood that the patient will require additional services to 
return to the preadmission environment? : No
Equipment needed for post hospitalization: : None
Living Arrangements: : Home with Spouse/Significant Other
Patient with capacity for self-care or can be cared for in same environment 
as prior to hospitalization? : Yes
Baseline cognitive status: : *Oriented to person, place, situation, time and 
present
Physical environment modification needed / anticipated for discharge: : No
Medication Management: : Patient states can afford medications
Medication Management: : Patient states can read and understand medication 
labels
Pharmacy name(s): : [facility]
Does Patient have transportation to get home and to follow-up medical 
appointments when discharged from the hospital? : Yes
Would patient like to participate in any Care Coordination programs (if 
applicable): : Not applicable
Does the patient have electricity at home? : Yes
Does the patient have running water in their house? : Yes
Equipment in use: : Shower Chair
Mental health screen: : No mental health history
 
 
 
DCP Re-evaluation
QUESTION: ANSWER
Would patient like to participate in any Care Coordination programs (if 
applicable): : Not applicable
 
 
 
 
 
 
 
 
PATIENT:  SAL EDDY
ENCOUNTER:  [id number]
MEDICAL RECORD#:  [id number]
ADMISSION DATE:  2021
DISCHARGE DATE:  
ATTENDING MD:  QUIRINO PERERA
:   1940-Dec-03
AGE:  80
MARITAL STATUS:   M
DC PLAN ID:  [id number]
FACILITY:   [facility]
PRINTED ON: 21  15:55  CT
 
 
 
 
 
 
 
 
**All edits/amendments must be made on the electronic document**
 
DICTATION DATE: 21     : FADY  21 155     
RPT#: [id number]                                DC DATE:        
                                               STATUS: ADM IN  
[facility]
 [place] [address]
***END OF REPORT***

## 2021-05-13 NOTE — MORECARE
CASE MANAGEMENT DISCHARGE SUMMARY
 
 
PATIENT: SAL EDDY                      UNIT: [id number]
ACCOUNT#: [id number]                       ADM DATE: 21
AGE: 80     : 44  SEX: M            ROOM/BED: D.1209    
AUTHOR: FLO,DOC                             PHYSICIAN:                               
 
REFERRING PHYSICIAN: QUIRINO CABRERA DO         
DATE OF SERVICE: 21
Case Management Discharge Planning Summary
 
 
COMMENTS 
ENTERED DATE:  21  17:07 CT
COMMENT TYPE:  Discharge Planning
REVIEWER: Yi Murillo
CM called [facility] and schedule patient therapy 
appointment for Monday 11:00. Faxed orders / records as requested. CM met 
with patient and instructed him on therapy appointment. Patient verbalized 
understanding and satisfaction with discharge plan. CM gave patient 
photocopy of scheduled appointment. Denies any other discharge planning 
needs at this time.
__________________________________________________
ENTERED DATE:  21  18:44 CT
COMMENT TYPE:  Discharge Planning
REVIEWER: Yony Suárez
CM met with patient to complete DC plan and to evaluate needs.  Patient 
lives independently with his wife, Aruna Eddy, [phone].  Patient 
stated that his home is safe and has electricity and running water.  Patient 
stated that he has no problems paying for medications and he fills his 
medications at [facility].  Patient stated that 
his primary care physician is Dr. Pardo.  At discharge, the patient plans 
to return home and feels this is a safe discharge.  CM discussed 
availability of home health, rehab services, and medical equipment.  Patient 
declined HHS, SNF, IPR, and DME.  Patient would like outpatient PT with 
[facility] next to [facility].  Patient stated that he his CPM and 
Walker were delivered before his surgery.  Patient stated that he has a 
Shower Bench.  VINOD signed and placed in chart.  Patient voiced no other 
needs at this time and is satisfied with DC plan.  Transportation provider 
at discharge will be with his wife, Aruna.  VIRK delivered, explained, 
signed by the patient, and placed in chart. Signed form also left with the 
patient.  CM will continue to follow and will assist as needed with dc 
plans/needs.
 
 
DCP REVIEW SUMMARY
ANTICIPATED D/C DATE: 
EXPECTED LOS : 
CASE STATUS:  DCP Initiated
 
INITIAL REVIEW:  2021
INITIAL REVIEWER:   Yony Suárez
FINAL DISCHARGE DISPOSITION:  : 
FINAL REVIEWER:  
FINAL REVIEW DATE: 
 
  
 
DCP Focus Questions & Answers
 
DCP Evaluation
QUESTION: ANSWER
Patient's ability to cope with chronic illness : d. No chronic illness
Patient gives permission to discuss discharge plans with:  (name, 
relationship and number) : wife, Aruna Eddy, [phone]
Patient and/or caregiver agree upon recommended discharge plan? : Yes
Family / Caregiver's ability to cope with chronic illness: : a. Adequate 
(ability to meet patient's medical needs, ensures patient attends medical 
appts.)
Patient's current cognitive status: : *Oriented to person, place, situation, 
time and present
Family / Caregiver's ability to cope with chronic illness: : a. Adequate 
(ability to meet patient's medical needs, ensures patient attends medical 
appts.)
Physical Status: : Independent with ADL's
Does the patient have the ability to pay for or attain post discharge needs 
/ services? : Yes
Functional screen assessment: : Basic needs can adequately be met by self
Living Arrangements: : Home with Spouse/Significant Other
Equipment needed for post hospitalization: : None
Is there a likelihood that the patient will require additional services to 
return to the preadmission environment? : No
Baseline cognitive status: : *Oriented to person, place, situation, time and 
present
Patient with capacity for self-care or can be cared for in same environment 
as prior to hospitalization? : Yes
Physical environment modification needed / anticipated for discharge: : No
Medication Management: : Patient states can read and understand medication 
labels
Medication Management: : Patient states can afford medications
Pharmacy name(s): : [facility]
Does Patient have transportation to get home and to follow-up medical 
appointments when discharged from the hospital? : Yes
Would patient like to participate in any Care Coordination programs (if 
applicable): : Not applicable
Does the patient have electricity at home? : Yes
Does the patient have running water in their house? : Yes
Equipment in use: : Shower Chair
Mental health screen: : No mental health history
 
 
 
 
DCP Re-evaluation
QUESTION: ANSWER
Would patient like to participate in any Care Coordination programs (if 
applicable): : Not applicable
 
 
 
 
 
 
 
PATIENT:  SAL EDDY
ENCOUNTER:  [id number]
MEDICAL RECORD#:  [id number]
ADMISSION DATE:  2021
DISCHARGE DATE:  2021
ATTENDING MD:  QUIRINO PERERA
:   1940-Dec-03
AGE:  80
MARITAL STATUS:   M
DC PLAN ID:  [id number]
FACILITY:   [facility]
PRINTED ON: 21  10:34  CT
 
 
 
 
 
 
 
 
**All edits/amendments must be made on the electronic document**
 
DICTATION DATE: 21 1034     : FADY  21 1034     
RPT#: [id number]                                DC DATE:21
                                               STATUS: DIS IN  
[facility]
[address] [place] [address]
***END OF REPORT***

## 2021-06-23 ENCOUNTER — HOSPITAL ENCOUNTER (OUTPATIENT)
Dept: HOSPITAL 84 - D.OPS | Age: 81
Discharge: HOME | End: 2021-06-23
Attending: SURGERY
Payer: MEDICARE

## 2021-06-23 VITALS
BODY MASS INDEX: 29.18 KG/M2 | BODY MASS INDEX: 29.18 KG/M2 | WEIGHT: 208.44 LBS | HEIGHT: 71 IN | WEIGHT: 208.44 LBS | HEIGHT: 71 IN

## 2021-06-23 VITALS — DIASTOLIC BLOOD PRESSURE: 34 MMHG | SYSTOLIC BLOOD PRESSURE: 142 MMHG

## 2021-06-23 DIAGNOSIS — K21.9: ICD-10-CM

## 2021-06-23 DIAGNOSIS — I10: ICD-10-CM

## 2021-06-23 DIAGNOSIS — I50.22: ICD-10-CM

## 2021-06-23 DIAGNOSIS — K59.00: ICD-10-CM

## 2021-06-23 DIAGNOSIS — K57.30: ICD-10-CM

## 2021-06-23 DIAGNOSIS — E78.00: ICD-10-CM

## 2021-06-23 DIAGNOSIS — K22.2: Primary | ICD-10-CM

## 2021-06-23 DIAGNOSIS — M16.11: ICD-10-CM

## 2021-06-23 DIAGNOSIS — M25.361: ICD-10-CM

## 2021-06-23 LAB
ANION GAP SERPL CALC-SCNC: 9.6 MMOL/L (ref 8–16)
APTT BLD: 28.3 SECONDS (ref 22.8–39.4)
BUN SERPL-MCNC: 15 MG/DL (ref 7–18)
CALCIUM SERPL-MCNC: 9.2 MG/DL (ref 8.5–10.1)
CHLORIDE SERPL-SCNC: 99 MMOL/L (ref 98–107)
CO2 SERPL-SCNC: 31 MMOL/L (ref 21–32)
CREAT SERPL-MCNC: 1.2 MG/DL (ref 0.6–1.3)
ERYTHROCYTE [DISTWIDTH] IN BLOOD BY AUTOMATED COUNT: 13.2 % (ref 11.5–14.5)
GLUCOSE SERPL-MCNC: 91 MG/DL (ref 74–106)
HCT VFR BLD CALC: 42.6 % (ref 42–54)
HGB BLD-MCNC: 14.2 G/DL (ref 13.5–17.5)
INR PPP: 1.09 (ref 0.85–1.17)
MCH RBC QN AUTO: 31.7 PG (ref 26–34)
MCHC RBC AUTO-ENTMCNC: 33.3 G/DL (ref 31–37)
MCV RBC: 95.1 FL (ref 80–100)
OSMOLALITY SERPL CALC.SUM OF ELEC: 270 MOSM/KG (ref 275–300)
PLATELET # BLD: 246 10X3/UL (ref 130–400)
PMV BLD AUTO: 6.8 FL (ref 7.4–10.4)
POTASSIUM SERPL-SCNC: 4.6 MMOL/L (ref 3.5–5.1)
PROTHROMBIN TIME: 13.1 SECONDS (ref 11.6–15)
RBC # BLD AUTO: 4.48 10X6/UL (ref 4.2–6.1)
SODIUM SERPL-SCNC: 135 MMOL/L (ref 136–145)
WBC # BLD AUTO: 5.9 10X3/UL (ref 4.8–10.8)

## 2021-06-23 NOTE — NUR
1435 - PATIENT UP TO DRESS FOR DISCHARGE. INSTRUCTIONS GIVEN ALONG
WITH LIST OF NSAIDS TO AVOID FOR 10-14 DAYS. PATIENT IS NO LONGER ON
ELEQUIS OR ANY BLOOD THINNERS.

## 2023-01-24 NOTE — NUR
FAMILY/NEIGHBOR AT BEDSIDE, A&0 X 4. ABDOMEN DISTENDED, BOWEL SOUNDS ACTIVE X
4. ALL DRESSINGS CLEAN EXCEPT SCANT BLOOD NOTED ON RIGHT UPPER
ABDOMEN BANDANGE. WILL CONTINUE TO MONITOR. I called and spoke with Navneet Blanco and she verbalized understanding and had no questions at this time.